# Patient Record
Sex: MALE | Race: WHITE
[De-identification: names, ages, dates, MRNs, and addresses within clinical notes are randomized per-mention and may not be internally consistent; named-entity substitution may affect disease eponyms.]

---

## 2017-01-18 ENCOUNTER — HOSPITAL ENCOUNTER (OUTPATIENT)
Dept: HOSPITAL 75 - PREOP | Age: 62
End: 2017-01-18
Attending: INTERNAL MEDICINE
Payer: COMMERCIAL

## 2017-01-18 VITALS — HEIGHT: 74 IN | WEIGHT: 230 LBS | BODY MASS INDEX: 29.52 KG/M2

## 2017-01-18 DIAGNOSIS — Z01.818: Primary | ICD-10-CM

## 2017-01-18 DIAGNOSIS — Z12.11: ICD-10-CM

## 2017-01-20 ENCOUNTER — HOSPITAL ENCOUNTER (OUTPATIENT)
Dept: HOSPITAL 75 - SDC | Age: 62
Discharge: HOME | End: 2017-01-20
Attending: INTERNAL MEDICINE
Payer: COMMERCIAL

## 2017-01-20 VITALS — BODY MASS INDEX: 29.52 KG/M2 | HEIGHT: 74 IN | WEIGHT: 230 LBS

## 2017-01-20 VITALS — SYSTOLIC BLOOD PRESSURE: 123 MMHG | DIASTOLIC BLOOD PRESSURE: 94 MMHG

## 2017-01-20 VITALS — SYSTOLIC BLOOD PRESSURE: 142 MMHG | DIASTOLIC BLOOD PRESSURE: 100 MMHG

## 2017-01-20 VITALS — SYSTOLIC BLOOD PRESSURE: 112 MMHG | DIASTOLIC BLOOD PRESSURE: 94 MMHG

## 2017-01-20 VITALS — DIASTOLIC BLOOD PRESSURE: 94 MMHG | SYSTOLIC BLOOD PRESSURE: 123 MMHG

## 2017-01-20 DIAGNOSIS — K63.5: ICD-10-CM

## 2017-01-20 DIAGNOSIS — Z12.11: Primary | ICD-10-CM

## 2017-01-20 DIAGNOSIS — K57.30: ICD-10-CM

## 2017-01-20 NOTE — HISTORY AND PHYSICAL
DICTATING PHYSICIAN: 

Dr. Rabago 

 

DATE OF ADMISSION: 

2017

 

Mr. Fournier is a 61-year-old white male referred for screening

colonoscopy. Past history of adenomatous colonic polyps on

colonoscopy in 2006. He had 2 pedunculated polyps; one removed

from the mid transverse colon and the other one from the hepatic

flexure. He was referred by Dr. Arce.  He reports no

significant change in his health history. He has noted no blood

in his stool. Denies bowel habit change. He has been on a lower

carbohydrate diet and has lost over 20 pounds, in the past 6

months. He reports improved energy level and denies chest

discomfort, dyspnea on exertion or palpitations. 

 

PAST MEDICAL HISTORY:

1.   Significant for hypertension. 

2.   Reflux. 

3.   He has undergone EGD in the past and had no evidence for

Soriano's change. 

 

MEDICATIONS:

1.   Lisinopril 10 mg daily. 

2.   Prilosec 20 mg daily. 

3.   Fish oil 1000 mg daily. 

4.   Aspirin 81 mg daily. 

5.   Multiple vitamin daily. 

 

SOCIAL HISTORY:

He has rare social alcohol intake with no past smoking history.

he is employed. 

 

FAMILY HISTORY:

Father  at age of 76, secondary lung cancer with smoking

history. Mother  at age 59 of breast cancer. He is not aware

of any family history for colon cancer. He has several siblings

alive and well. He has not aware that they have any health

problems.  

 

PAST SURGICAL HISTORY:

1.   He had recent cataract surgery. 

2.   Reporting no other significant surgeries. 

 

PHYSICAL EXAMINATION:

Reveals a well-appearing white male in no acute distress. 

 

Blood pressure 112/86, heart rate was 72 and regular. 

 

HEENT EXAMINATION:   Unremarkable. Sclera are nonicteric.  Oral

cavity reveals a Mallampati class II configuration. Oral cavity

is clear, no exudates or erythema is noted. Dentition is good. 

NECK: Reveals no JVD, adenopathy or bruits. 

CHEST: Clear. 

CV: Reveals regular rate and rhythm without murmur, S3 or S4. 

ABDOMEN: Soft, supple without masses, organomegaly or tenderness.

No bruits are noted. Bowel sounds are positive in all 4

quadrants. 

EXTREMITIES: Reveal no cyanosis, clubbing, or edema. 

 

ASSESSMENT:

The patient was set-up for screening colonoscopy on . Prep

instructions with the Bonilla-prep kit were given and questions were

answered.  35 minutes of face-to-face care time was spent by

myself with another 15 minutes of staff time in setting up his

colonoscopy and going over prep instructions. I thank you for the

referral of this pleasant gentleman. Sincerely, Rene Rabago 

 

 

 

Job ID: [id number] 

Dictated Date: 2017 19:26:00 

Transcription Date: 01/10/2017 10:24:36/vito

## 2017-01-20 NOTE — XMS REPORT
Continuity of Care Document

 Created on: 2017



PIA COLBY

External Reference #: [id number]

: 1955

Sex: Male



Demographics







 Address  [address] [place]  [address]

 

 Home Phone  [phone]

 

 Preferred Language  English

 

 Marital Status  Unknown

 

 Pentecostalism Affiliation  Unknown

 

 Race  Unknown

 

 Ethnic Group  Unknown





Author







 Author  [facility]

 

 Organization  [facility]

 

 Address  Unknown

 

 Phone  Unavailable







Support







 Name  Relationship  Address  Phone

 

 DAYANA BUCKNER MD  Caregiver  [address], [address]

[place]  [address] [phone]

 

 ALESHIA COLBY  Next Of Kin  [address] [place]  [address] [phone]







Insurance Providers







 Payer Name  Policy Number  Subscriber Name  Relationship

 

 [facility]  [id number]  Pia Cloby  18 Self / Same As Patient







Advance Directives







 Directive  Response  Recorded Date/Time

 

 Advance Directives  No  17 11:16am

 

 Health Care Power of   No  17 11:16am

 

 Organ Donor  Yes  17 11:16am

 

 Resuscitation Status  Full Code  17 11:16am







Problems

No problem information available.



Medications

Current Home Medications







 Medication  Dose  Units  Route  Directions  Days/Qty  Instructions  Start Date

 

 Lisinopril 20 Mg  20  Mg  Oral  Daily        11

 

 Omeprazole 20 Mg  20  Mg  Oral  Daily        11

 

 Aspirin 81 Mg  81  Mg  Oral  Daily        11

 

 Multivitamin 1 Each  1  Each  Oral  Daily        17







Social History







 Social History Problem  Response  Recorded Date/Time

 

 Alcohol Use  Occasionally Uses  2017 11:16am

 

 Recreational Drug Use  No  2017 11:16am

 

 Recent Foreign Travel  No  2017 11:15am

 

 Recent Infectious Disease Exposure  No  2017 11:15am

 

 Sexually Transmitted Disease  No  2017 11:16am

 

 HIV/AIDS  No  2017 11:16am

 

 Smoking Status  Never a Smoker  2017 11:16am

 

 Recent Hopitalizations  No  2017 11:16am

 

 Sexually Transmitted Disease  No  2017 11:16am









 Query  Response  Start Date  Stop Date

 

 Smoking Status  Never a Smoker      







Hospital Discharge Instructions

No hospital discharge instructions.



Plan of Care







 Discharge Date  17 11:22am

 

 Prescriptions  See Medication Section







Functional Status

No functional status results.



Allergies, Adverse Reactions, Alerts

No known allergies.



Immunizations

No immunization records.



Vital Signs

Acute Vital Signs





 Vital  Response  Date/Time

 

 Height (Feet)  6 feet  2017 11:15am

 

 Height (Inches)  2.00 inches  2017 11:15am

 

 Height (Calculated Centimeters)  187.686046 cm  2017 11:15am

 

 Weight (Pounds)  230 pounds  2017 11:15am

 

 Weight (Ounces)  0.0 oz  2017 11:15am

 

 Weight (Calculated Grams)  576306.25 gm  2017 11:15am

 

 Weight (Calculated Kilograms)  104.016268 kilograms  2017 11:15am

 

 Calculated BMI  29.5  2017 11:15am







Results

No known relevant diagnostic tests, laboratory data and/or discharge summary.



Procedures

No known history of procedures.



Encounters







 Encounter  Location  Arrival/Admit Date  Discharge/Depart Date  Attending 
Provider

 

 Registered Clinic  [facility]  17 5:40am     DAYANA BUCKNER MD

## 2017-01-23 NOTE — PROCEDURE REPORT
PROCEDURE PHYSICIAN:   DAYANA RABAGO

 

DATE OF PROCEDURE:  

01/20/2017

 

INDICATION FOR THE PROCEDURE:

Screening colonoscopy. 

 

REFERRING PHYSICIAN:

Dr. Arce 

 

PROCEDURE:

The patient was placed in the left lateral decubitus position.

Prior to undergoing colonoscopy, digital rectal evaluation was

performed. The prostate is mildly enlarged, anodular and

nontender to digital inspection. Anal sphincter tone was normal

and the perianal reflex was intact. No other abnormalities were

noted to digital inspection of the anal canal or distal rectal

vault. The colonoscope was then inserted into the rectum and

under direct visualization advanced to the cecum. The cecum was

identified by identification of the ileocecal valve and cecal

strap. Photographic documentation was obtained. Careful

inspection was made as the colonoscope was withdrawn. The patient

tolerated the procedure well. 

 

FINDINGS:

There is no evidence for internal or external hemorrhoids. The

rectum was unremarkable. Present in the rectosigmoid junction was

a diminutive hyperplastic appearing polyp. It was biopsied and

ablated, and submitted for histopathology with no subsequent

blood loss. Several small sigmoid diverticulum were present,

without evidence for diverticulitis. Another 3 mm sessile polyp

was noted in the mid sigmoid colon. It was biopsied and ablated

and submitted for histopathology. No other  abnormalities were

appreciated. The descending colon, splenic flexure, transverse

colon, hepatic flexure, ascending colon and cecum were

unremarkable. 

 

ASSESSMENT:

2 diminutive polyps with hyperplastic features were biopsied and

ablated, one from the rectosigmoid junction; one from the mid

sigmoid colon. Mild diverticular disease is present confined to the            
                              sigmoid colon with no other significant 
abnormalities being noted on                                             today'
s procedure. I would advocate consideration for repeat surveillance            
                         colonoscopy in 5 years as long as there are no 
surprises on histopathology

reporting. I thank you for the referral of this pleasant gentleman.

Sincerely, Dayana Albrechton 

 

 

 

Job ID: [id number]

Dictated Date: 01/22/2017 09:23:59 

Transcription Date: 01/23/2017 09:11:14 / vito PATTERSON

## 2018-11-05 ENCOUNTER — HOSPITAL ENCOUNTER (EMERGENCY)
Dept: HOSPITAL 75 - ER | Age: 63
Discharge: HOME | End: 2018-11-05
Payer: COMMERCIAL

## 2018-11-05 VITALS — DIASTOLIC BLOOD PRESSURE: 95 MMHG | SYSTOLIC BLOOD PRESSURE: 131 MMHG

## 2018-11-05 VITALS — WEIGHT: 232 LBS | HEIGHT: 73 IN | BODY MASS INDEX: 30.75 KG/M2

## 2018-11-05 DIAGNOSIS — Z90.89: ICD-10-CM

## 2018-11-05 DIAGNOSIS — T78.3XXA: Primary | ICD-10-CM

## 2018-11-05 DIAGNOSIS — K21.9: ICD-10-CM

## 2018-11-05 DIAGNOSIS — I10: ICD-10-CM

## 2018-11-05 DIAGNOSIS — Z88.8: ICD-10-CM

## 2018-11-05 PROCEDURE — 99283 EMERGENCY DEPT VISIT LOW MDM: CPT

## 2018-11-05 NOTE — XMS REPORT
Continuity of Care Document

 Created on: 2018



PIA OCLBY

External Reference #: [id number]

: 1955

Sex: Male



Demographics







 Address  [address] [place]  [address]

 

 Home Phone  [phone] x

 

 Preferred Language  Unknown

 

 Marital Status  Unknown

 

 Gnosticism Affiliation  Unknown

 

 Race  Unknown

 

 Ethnic Group  Unknown





Author







 Author  [facility]

 

 Organization  [facility]

 

 Address  Unknown

 

 Phone  Unavailable



              



Allergies

      





 Active            Description            Code            Type            
Severity            Reaction            Onset            Reported/Identified   
         Relationship to Patient            Clinical Status        

 

 Yes            No Known Drug Allergies            [id number]            Drug 
Allergy            Unknown            N/A                         2017   
                               



                  



Medications

      



There is no data.                  



Problems

      





 Date Dx Coded            Attending            Type            Code            
Diagnosis            Diagnosed By        

 

 2011                         Ot            211.3            BENIGN 
NEOPLASM LG BOWEL                     

 

 2011                         Ot            562.10            
DIVERTICULOSIS COLON (W/O MENT OF HEMORR                     

 

 2011                         Ot            V76.51            SCREEN MAL 
NEOP-COLON                     

 

 01/10/2017                         Ot            786.2            COUGH       
              

 

 2017            DAYANA BUCKNER MD            Ot            Z01.818     
       ENCOUNTER FOR OTHER PREPROCEDURAL EXAMIN                     

 

 2017            DAYANA BUCKNER MD            Ot            Z12.11      
      ENCOUNTER FOR SCREENING FOR MALIGNANT NE                     

 

 2017                         Ot            786.2            COUGH       
              

 

 2017            DAYANA BUCKNER MD            Ot            K57.30      
      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                     

 

 2017            DAYANA BUCKNER MD            Ot            K63.5       
     POLYP OF COLON                     

 

 2017            DAYANA BUCKNER MD            Ot            Z12.11      
      ENCOUNTER FOR SCREENING FOR MALIGNANT NE                     

 

 2017            DAYANA BUCKNER MD            Ot            K57.30      
      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                     

 

 2017            DAYANA BUCKNER MD            Ot            K63.5       
     POLYP OF COLON                     

 

 2017            DAYANA BUCKNER MD            Ot            Z12.11      
      ENCOUNTER FOR SCREENING FOR MALIGNANT NE                     



                                          



Procedures

      



There is no data.                  



Results

      



There is no data.              



Encounters

      





 ACCT No.            Visit Date/Time            Discharge            Status    
        Pt. Type            Provider            Facility            Loc./Unit  
          Complaint        

 

 [id number]            2017 08:59:00            2017 11:15:00    
        DIS            Outpatient            DAYANA BUCKNER MD            [facility]            SCREENING        

 

 [id number]            2017 05:40:00            2017 11:22:00    
        DIS            Outpatient            SITA CALERO, DAYANA NGUYEN            [facility] 
[facility]            PREOP            SCREENING        

 

 [id number]            2015 17:00:00            2015 23:59:59    
        CLS            Outpatient            TABITHA GABRIEL            
[facility]            QUICK                     

 

 [id number]            2018 15:54:00                         ACT       
     Emergency            ROSA CALERO, YARIEL WOLFF            [facility]            ER            TONGUE SWELLING        

 

 [id number]            2012 14:47:00                                   
   Document Registration                                                       
     

 

 [id number]            2011 07:19:00                                   
   Document Registration

## 2018-11-05 NOTE — ED GENERAL
General


Chief Complaint:  Allergic Reaction


Stated Complaint:  TONGUE SWELLING


Nursing Triage Note:  


PT PRESENTS TO ER WITH COMPLAINT OF RIGHT SIDED TONGUE SWELLING. PT STATES IT 


STARTED AROUND 1415.


Nursing Sepsis Screen:  No Definite Risk


Source of Information:  Patient


Exam Limitations:  No Limitations





History of Present Illness


Date Seen by Provider:  Nov 5, 2018


Time Seen by Provider:  16:35


Initial Comments


This 63-year-old gentleman presents to the emergency room with complaints of 

swelling to the right side of his tongue and some mild numbness around the 

lips.  He experienced some similar lip tingling many years ago but has never 

experienced tongue swelling.  He denies difficulty breathing or tightness in 

the throat.  He does take lisinopril.  Symptoms started around 14:30.  He 

denies any hives or itching.  He has not taken any medications to treat the 

swelling.





Allergies and Home Medications


Allergies


Coded Allergies:  


     lisinopril (Verified  Allergy, Intermediate, Angioedema, 11/5/18)


 


 Andioedema





Home Medications


Lisinopril 20 Mg Tab, 20 MG PO DAILY, (Reported)


Multivitamin 1 Each Tablet, 1 EACH PO DAILY, (Reported)


Omeprazole 20 Mg Capsule.dr, 20 MG PO DAILY, (Reported)


Prednisone 20 Mg Tab, 40 MG PO DAILY


   Prescribed by: YARIEL GARDINER on 11/5/18 6554





Patient Home Medication List


Home Medication List Reviewed:  Yes





Review of Systems


Review of Systems


Constitutional:  no symptoms reported


EENTM:  see HPI


Respiratory:  no symptoms reported


Cardiovascular:  no symptoms reported


Gastrointestinal:  no symptoms reported


Genitourinary:  no symptoms reported


Musculoskeletal:  no symptoms reported


Skin:  no symptoms reported


Psychiatric/Neurological:  No Symptoms Reported


Hematologic/Lymphatic:  No Symptoms Reported


Immunological/Allergic:  see HPI





Past Medical-Social-Family Hx


Patient Social History


Alcohol Use:  Denies Use


Recreational Drug Use:  No


Smoking Status:  Never a Smoker


Recent Foreign Travel:  No


Contact w/Someone Who Travel:  No


Recent Infectious Disease Expo:  No


Recent Hopitalizations:  No





Seasonal Allergies


Seasonal Allergies:  No





Past Medical History


Surgeries:  Yes (DETATCDED RETINA, WISDOM TEETH, SKIN GRAFT)


Tonsillectomy


Respiratory:  No


Cardiac:  Yes


Hypertension


Neurological:  No


Reproductive Disorders:  No


Sexually Transmitted Disease:  No


HIV/AIDS:  No


Gastrointestinal:  Yes


Gastroesophageal Reflux


Musculoskeletal:  No


Endocrine:  No


Loss of Vision:  Bilateral


Hearing Impairment:  Denies


Cancer:  No


Psychosocial:  No


Integumentary:  No


Blood Disorders:  No


Adverse Reaction/Blood Tranf:  No (N/A)





Physical Exam


Vital Signs





Vital Signs - First Documented








 11/5/18





 16:05


 


Temp 98.0


 


Pulse 85


 


Resp 19


 


B/P (MAP) 131/95 (107)


 


Pulse Ox 95


 


O2 Delivery Room Air





Capillary Refill : Less Than 3 Seconds


Height, Weight, BMI


Height: 6'1.00"


Weight: 232lbs. 0.0oz. 105.978836df; 29.5 BMI


Method:Stated


General Appearance:  No Apparent Distress, WD/WN


HEENT:  PERRL/EOMI, Normal ENT Inspection, Pharynx Normal, Other (Swelling to 

the right side of the tongue)


Neck:  Normal Inspection


Respiratory:  Lungs Clear, Normal Breath Sounds, No Accessory Muscle Use, No 

Respiratory Distress


Cardiovascular:  Regular Rate, Rhythm, No Edema, No Murmur


Extremity:  Normal Inspection, No Pedal Edema


Neurologic/Psychiatric:  Alert, Oriented x3, No Motor/Sensory Deficits, Normal 

Mood/Affect, CNs II-XII Norm as Tested


Skin:  Normal Color, Warm/Dry; No Rash





Progress/Results/Core Measures


Suspected Sepsis


Recent Fever Within 48 Hours:  No


Infection Criteria Present:  None


New/Unexplained  Altered Menta:  No


Sepsis Screen:  No Definite Risk


SIRS


Temperature:98.0 


Pulse: 85 


Respiratory Rate: 19


 


Blood Pressure 131 /95 


Mean: 107





Results/Orders


My Orders





Orders - YARIEL LEE MD


Famotidine Tablet (Pepcid Tablet) (11/5/18 16:45)


Prednisone Tablet (Deltasone Tablet) (11/5/18 16:45)


Diphenhydramine Tablet (Benadryl Tablet) (11/5/18 16:45)





Medications Given in ED





Current Medications








 Medications  Dose


 Ordered  Sig/Shukri


 Route  Start Time


 Stop Time Status Last Admin


Dose Admin


 


 Diphenhydramine


 HCl  50 mg  ONCE  ONCE


 PO  11/5/18 16:45


 11/5/18 16:46 DC 11/5/18 16:50


50 MG


 


 Famotidine  20 mg  ONCE  ONCE


 PO  11/5/18 16:45


 11/5/18 16:46 DC 11/5/18 16:50


20 MG


 


 Prednisone  40 mg  ONCE  ONCE


 PO  11/5/18 16:45


 11/5/18 16:46 DC 11/5/18 16:50


40 MG








Vital Signs/I&O











 11/5/18 11/5/18





 16:05 18:25


 


Temp 98.0 98.0


 


Pulse 85 85


 


Resp 19 19


 


B/P (MAP) 131/95 (107) 131/95 (107)


 


Pulse Ox 95 95


 


O2 Delivery Room Air 





Capillary Refill : Less Than 3 Seconds








Blood Pressure Mean:  107








Progress Note :  


Progress Note


Patient has had no progression in symptoms since onset at 14:30.  He was given 

the option of IV therapy versus oral therapy.  He selected oral therapy and was 

given prednisone, Pepcid, and Benadryl.  He was monitored until there was 

improvement in his symptoms.  He was then dismissed home.  He was instructed to 

discontinue lisinopril use and listed as an allergy as this is the likely cause 

of his angioedema.  He identified no other new exposures that may have 

triggered it.





Departure


Impression





 Primary Impression:  


 Angioedema


 Qualified Codes:  T78.3XXA - Angioneurotic edema, initial encounter


Disposition:  01 HOME, SELF-CARE


Condition:  Improved





Departure-Patient Inst.


Decision time for Depature:  18:04


Referrals:  


TEODORO PAYNE MD (PCP)


Primary Care Physician


Patient Instructions:  Angioedema





Add. Discharge Instructions:  


Keep Benadryl (diphenhydramine) and Pepcid (famotidine) on hand at all times 

over the next few days.


Symptoms return, take Pepcid 20 mg as often as every 12 hours and Benadryl 50 

mg as often as every 4 hours as needed to control symptoms.


If symptoms are severe causing shortness of breath or throat swelling, call 911 

or return immediately to the emergency room.


Start the prednisone prescription tomorrow morning if symptoms rebound.


Contact your primary care provider to find an alternative for lisinopril.  Do 

not take lisinopril or any member of the ACE inhibitor family of blood pressure 

medications.  Always list lisinopril as an allergy at future healthcare 

encounters.

















All discharge instructions reviewed with patient and/or family. Voiced 

understanding.


Scripts


Prednisone (Prednisone) 20 Mg Tab


40 MG PO DAILY, #6 TAB


   Prov: YARIEL LEE MD         11/5/18





Copy


Copies To 1:   TEODORO PAYNE MD, JOSHUA T MD Nov 5, 2018 18:07

## 2022-08-24 ENCOUNTER — HOSPITAL ENCOUNTER (OUTPATIENT)
Dept: HOSPITAL 75 - PREOP | Age: 67
Discharge: HOME | End: 2022-08-24
Attending: INTERNAL MEDICINE
Payer: MEDICARE

## 2022-08-24 VITALS — BODY MASS INDEX: 31.67 KG/M2 | WEIGHT: 238.98 LBS | HEIGHT: 72.99 IN

## 2022-08-24 DIAGNOSIS — Z01.818: Primary | ICD-10-CM

## 2022-08-24 DIAGNOSIS — Z86.010: ICD-10-CM

## 2022-08-24 NOTE — HISTORY AND PHYSICAL
DATE OF SERVICE:  



COLONOSCOPY HISTORY AND PHYSICAL



HISTORY OF PRESENT ILLNESS:

The patient is a 67-year-old white male referred by Dr. Arce for diagnostic

colonoscopy due to past history of colon polyps.  He last underwent colonoscopy

in 2017 at which time he had two polyps, one from the rectosigmoid

junction and the other one from the distal sigmoid colon, both with hyperplastic

features.  He has had several adenomatous polyps removed, two pedunculated in

.  He does report some intermittent sharp stabbing right mid and upper

quadrant pain that will only last a minute or 2.  He takes Gas-X when this

occurs.  It is not associated with nausea and maybe happens once or twice a

month.  Otherwise, he denies abdominal pain and has had no bowel habit change. 

Denies melena or bright red blood per rectum.  He is not aware of any family

history for colon cancer.



PAST MEDICAL HISTORY:

Significant for hypertension, hyperlipidemia and gastroesophageal reflux.



MEDICATIONS ON ADMISSION:

Hydrochlorothiazide 25 mg daily, Flomax 0.4 mg daily and 20 mg of Prilosec

daily.



FAMILY HISTORY:

Father  at age 76 secondary to lung cancer with a long smoking history. 

Mother  at 59 of breast cancer.  He has one older brother recently diagnosed

with lung disease.  It sounds like possible interstitial fibrosis, living at the

age of 71.  Brother in his mid 60s, alive and well.



SOCIAL HISTORY:

He has rare alcohol intake with no past smoking history.  Employed.



REVIEW OF SYSTEMS:

CONSTITUTIONAL:  Denies night sweats, chills, fever or change in weight.

GASTROINTESTINAL:  As noted in the HPI.

PULMONARY:  Denies cough, wheezing or shortness of breath.

CARDIOVASCULAR:  Denies orthopnea, PND, pedal edema or syncope.  Denies chest

discomfort.



PHYSICAL EXAMINATION:

GENERAL:  Reveals pleasant white male, appears to be in no acute distress.

VITAL SIGNS:  Weight 239 pounds, up 12 pounds from office weight 5-1/2 years

ago.  Blood pressure 120/78.

HEENT:  Unremarkable.

CHEST:  Clear to auscultation.

CARDIOVASCULAR:  Reveals regular rate and rhythm without murmur, S3 or S4.

ABDOMEN:  Soft, supple without mass, organomegaly or tenderness.

EXTREMITIES:  Reveal no cyanosis, clubbing or edema.



ASSESSMENT AND PLAN:

1.  The patient is being set up for diagnostic colonoscopy due to past history

of colon polyps.  Prep instructions were given and questions were answered. 

Electronic medical record was reviewed.  Dr. Arce's notation reviewed as well.

2.  Fleeting sharp right mid and upper quadrant pain.  No red flag symptoms, may

be more abdominal wall related.  I would only advise further investigation

should symptoms worsen.  Currently manageable.



I thank you for the referral of this pleasant gentleman.





Job ID: [id number]

DocumentID: [id number]

Dictated Date:  2022 15:42:42

Transcription Date: 2022 17:09:13

Dictated By: DAYANA BUCKNER MD

## 2022-09-09 ENCOUNTER — HOSPITAL ENCOUNTER (OUTPATIENT)
Dept: HOSPITAL 75 - ENDO | Age: 67
End: 2022-09-09
Attending: INTERNAL MEDICINE
Payer: MEDICARE

## 2022-09-09 VITALS — SYSTOLIC BLOOD PRESSURE: 136 MMHG | DIASTOLIC BLOOD PRESSURE: 99 MMHG

## 2022-09-09 VITALS — DIASTOLIC BLOOD PRESSURE: 82 MMHG | SYSTOLIC BLOOD PRESSURE: 119 MMHG

## 2022-09-09 VITALS — HEIGHT: 72.99 IN | BODY MASS INDEX: 31.67 KG/M2 | WEIGHT: 238.98 LBS

## 2022-09-09 VITALS — SYSTOLIC BLOOD PRESSURE: 118 MMHG | DIASTOLIC BLOOD PRESSURE: 76 MMHG

## 2022-09-09 VITALS — DIASTOLIC BLOOD PRESSURE: 91 MMHG | SYSTOLIC BLOOD PRESSURE: 120 MMHG

## 2022-09-09 DIAGNOSIS — Z86.010: ICD-10-CM

## 2022-09-09 DIAGNOSIS — R10.9: Primary | ICD-10-CM

## 2022-09-09 NOTE — ANESTHESIA-GENERAL POST-OP
MAC


Patient Condition


Mental Status/LOC:  Same as Preop


Cardiovascular:  Satisfactory


Nausea/Vomiting:  Absent


Respiratory:  Satisfactory


Pain:  Controlled


Complications:  Absent





Post Op Complications


Complications


None





Follow Up Care/Instructions


Patient Instructions


None needed.





Anesthesiology Discharge Order


Discharge Order


Patient is doing well, no complaints, stable vital signs, no apparent adverse 

anesthesia problems.   


No complications reported per nursing.











DOC GAO CRNA           Sep 9, 2022 11:34

## 2022-09-09 NOTE — PRE-OP NOTE & CONSCIOUS SEDAT
Pre-Operative Progress Note


Date H&P Reviewed:  Sep 9, 2022


Time H&P Reviewed:  08:10


History & Physical:  H&P Reviewed, Patient Examed, No changes noted


Pre-Op Diagnosis:  hx of colon polyps





Conscious Sedation Pre-Proced


ASA Score


2


For ASA 3 and 4: Consider anesthesia and medical clearance. Also, for patients

with a history of failed moderate sedation consider anesthesia.

















Airway 


 


Lungs 


 


Heart 


 


 ASA score


 


 ASA 1: a normal healthy patient


 


 ASA 2:  a patient with a mild systemic disease (mid diabetes, controlled 

hypertension, obesity 


 


 ASA 3:  a patient with a severe systemic disease that limits activity  (angina,

COPD, prior Myocardial infarction)


 


 ASA 4:  a patient with an incapacitating disease that is a constant threat to 

life (CHF, renal failure)


 


 ASA 5:  a moribund patient not expected to survive 24 hrs.  (ruptured aneurysm)


 


 ASA 6:  a declared brain-dead patient whose organs are being harvested.


 


 For emergent operations, add the letter E after the classification











Mallampati Classification


Grade 2





Sedation Plan


Analgesia, Amnesia, Plan communicated to team members, Discussed options with 

patient/fam, Discussed risks with patient/fam


The patient is an appropriate candidate to undergo the planned procedure, 

sedation, and anesthesia.





The patient immediately re-assessed prior to indication.











DAYANA BUCKNER MD               Sep 9, 2022 08:10

## 2022-09-09 NOTE — PROGRESS NOTE-POST OPERATIVE
Post-Procedure Note


Physician (s)/Assistant (s)


Physician


DAYANA BUCKNER MD





Pre-Procedure Diagnosis


Pre-Procedure Diagnosis:  hx of colon polyps





Post-Procedure Diagnosis


Post-operative diagnosis:  


normal colonoscopy











DAYANA BUCKNER MD               Sep 9, 2022 09:16

## 2022-09-09 NOTE — OPERATIVE REPORT
DATE OF SERVICE:  



COLONOSCOPY SUMMARY



INDICATION FOR THE PROCEDURE:

History of colon polyps.



DESCRIPTION OF PROCEDURE:

The patient was placed in the left lateral decubitus position.  Prior to

undergoing colonoscopy, digital rectal evaluation was performed.  Anal sphincter

tone was normal.  Prostate was unremarkable to digital inspection.  No

abnormalities were noted on digital inspection of the anal canal or distal

rectal vault.  The colonoscope was then inserted into the rectum and under

direct visualization advanced to cecum.  The cecum was identified by

identification of ileocecal valve and ileocecal strap.  Photographic

documentation was obtained.  Careful inspection was made as colonoscope

withdrawn.  Quality of prep was fair.



FINDINGS:

There was no evidence for internal or external hemorrhoids and the rectum,

sigmoid colon, descending colon, splenic flexure, transverse colon, hepatic

flexure, ascending colon, and cecum were unremarkable.



ASSESSMENT:

Normal colonoscopy to the cecum including digital rectal evaluation of the

prostate.  Considering history of colon polyps, would advocate consideration for

repeat surveillance colonoscopy in 5 years.



I thank you for the referral of this pleasant gentleman.





Job ID: [id number]

DocumentID: [id number]

Dictated Date:  09/09/2022 09:15:46

Transcription Date: 09/09/2022 13:35:39

Dictated By: DAYANA BUCKNER MD